# Patient Record
(demographics unavailable — no encounter records)

---

## 2025-05-15 NOTE — DISCUSSION/SUMMARY
[FreeTextEntry1] : Assessment is secondary amenorrhea.  Will get FSH, LH, TSH and prolactin along with beta hCG.  Will follow-up with pelvic sonogram as well.  Medical assistant Margo was present in the room during entire examination and discussion.

## 2025-05-15 NOTE — HISTORY OF PRESENT ILLNESS
[FreeTextEntry1] : 30-year-old -0-0-2 LMP 2025.  Patient is here with complaint of no menses for the last 3 to 4 months.  States that this happened approximately 2 years ago but was never diagnosed as a cause.  Has no other complaints or issues.  She is not trying to conceive and does not wish to be on any type of birth control pill or contraceptive.  Past medical, surgical and family history reviewed and updated.  UCG is negative in the office today.History via . #

## 2025-05-15 NOTE — PHYSICAL EXAM
[MA] : MA [Appropriately responsive] : appropriately responsive [Alert] : alert [No Acute Distress] : no acute distress [No Lymphadenopathy] : no lymphadenopathy [Regular Rate Rhythm] : regular rate rhythm [No Murmurs] : no murmurs [Clear to Auscultation B/L] : clear to auscultation bilaterally [Soft] : soft [Non-tender] : non-tender [Non-distended] : non-distended [No HSM] : No HSM [No Lesions] : no lesions [No Mass] : no mass [Oriented x3] : oriented x3 [Examination Of The Breasts] : a normal appearance [No Masses] : no breast masses were palpable [Labia Majora] : normal [Labia Minora] : normal [No Bleeding] : There was no active vaginal bleeding [Normal] : normal [Anteversion] : anteverted [Uterine Adnexae] : normal [FreeTextEntry2] : Margo [Tenderness] : nontender [Enlarged ___ wks] : not enlarged